# Patient Record
Sex: FEMALE | Race: WHITE | Employment: FULL TIME | URBAN - METROPOLITAN AREA
[De-identification: names, ages, dates, MRNs, and addresses within clinical notes are randomized per-mention and may not be internally consistent; named-entity substitution may affect disease eponyms.]

---

## 2017-07-05 PROBLEM — Z86.59 HISTORY OF MAJOR DEPRESSION: Status: ACTIVE | Noted: 2017-07-05

## 2017-07-05 PROBLEM — K58.0 IRRITABLE BOWEL SYNDROME WITH DIARRHEA: Status: ACTIVE | Noted: 2017-07-05

## 2017-07-05 PROBLEM — G47.9 SLEEP DISTURBANCE: Status: ACTIVE | Noted: 2017-07-05

## 2017-07-05 PROBLEM — K64.4 ANAL SKIN TAG: Status: ACTIVE | Noted: 2017-07-05

## 2017-11-22 ENCOUNTER — OFFICE VISIT (OUTPATIENT)
Dept: OBGYN CLINIC | Age: 27
End: 2017-11-22
Payer: COMMERCIAL

## 2017-11-22 VITALS
DIASTOLIC BLOOD PRESSURE: 64 MMHG | SYSTOLIC BLOOD PRESSURE: 112 MMHG | WEIGHT: 155.6 LBS | HEIGHT: 62 IN | BODY MASS INDEX: 28.63 KG/M2

## 2017-11-22 DIAGNOSIS — Z01.812 PRE-PROCEDURE LAB EXAM: Primary | ICD-10-CM

## 2017-11-22 PROCEDURE — 99203 OFFICE O/P NEW LOW 30 MIN: CPT | Performed by: OBSTETRICS & GYNECOLOGY

## 2017-11-22 RX ORDER — MISOPROSTOL 200 UG/1
TABLET ORAL
Qty: 2 TABLET | Refills: 0 | Status: SHIPPED | OUTPATIENT
Start: 2017-11-22 | End: 2017-11-29

## 2017-11-22 RX ORDER — IBUPROFEN 800 MG/1
800 TABLET ORAL EVERY 8 HOURS PRN
Qty: 20 TABLET | Refills: 0 | Status: SHIPPED | OUTPATIENT
Start: 2017-11-22 | End: 2018-05-17 | Stop reason: ALTCHOICE

## 2017-11-22 NOTE — PROGRESS NOTES
07/11/17   Comprehensive Metabolic Panel   Result Value Ref Range    Sodium  mmol/L    Chloride  mmol/L    Potassium  mmol/L    BUN  mg/dL    CREATININE      Glucose  mg/dL    AST  U/L    ALT  U/L    Calcium  mg/dL    Total Protein      CO2  mmol/L    Alb      Alkaline Phosphatase  U/L    Total Bilirubin  0.1 - 1.4 mg/dL    Gfr Calculated     Lipid Panel   Result Value Ref Range    Cholesterol, Total 224 mg/dL    HDL 52 35 - 70 mg/dL    LDL Calculated 118 0 - 160 mg/dL    Triglycerides 271 mg/dL    Chol/HDL Ratio 4.3     VLDL  mg/dL   TSH without Reflex   Result Value Ref Range    TSH  uIU/mL         Assessment:  1. Pre-procedure lab exam  misoprostol (CYTOTEC) 200 MCG tablet    ibuprofen (ADVIL;MOTRIN) 800 MG tablet    HCG, Quantitative, Pregnancy   Counseling for IUD placement  Patient Active Problem List    Diagnosis Date Noted    History of Clostridium difficile 07/05/2017    History of major depression 07/05/2017    Sleep disturbance 07/05/2017    Irritable bowel syndrome with diarrhea 07/05/2017    Anal skin tag 07/05/2017     PLAN:  Return in about 2 weeks (around 12/6/2017) for IUD placement. HCG lab order  Cytotec prior to IUD placement  Repeat Annual every 1 year  Cervical Cytology Evaluation begins at 24years old. If Negative Cytology, Follow-up screening per current guidelines. Return to the office in 2 weeks. Counseled on preventative health maintenance follow-up. Orders Placed This Encounter   Procedures    HCG, Quantitative, Pregnancy     Standing Status:   Future     Standing Expiration Date:   11/22/2018       Patient was seen with total face to face time of 30 minutes. More than 50% of this visit was counseling and education regarding The encounter diagnosis was Pre-procedure lab exam. and Gynecologic Exam   as well as  counseling on preventative health maintenance follow-up.

## 2017-11-29 ENCOUNTER — PROCEDURE VISIT (OUTPATIENT)
Dept: OBGYN CLINIC | Age: 27
End: 2017-11-29
Payer: COMMERCIAL

## 2017-11-29 ENCOUNTER — HOSPITAL ENCOUNTER (OUTPATIENT)
Age: 27
Setting detail: SPECIMEN
Discharge: HOME | End: 2017-11-29
Payer: COMMERCIAL

## 2017-11-29 VITALS
SYSTOLIC BLOOD PRESSURE: 90 MMHG | DIASTOLIC BLOOD PRESSURE: 60 MMHG | BODY MASS INDEX: 28.71 KG/M2 | WEIGHT: 156 LBS | HEIGHT: 62 IN

## 2017-11-29 DIAGNOSIS — Z01.812 PRE-PROCEDURE LAB EXAM: ICD-10-CM

## 2017-11-29 DIAGNOSIS — Z97.5 IUD (INTRAUTERINE DEVICE) IN PLACE: ICD-10-CM

## 2017-11-29 DIAGNOSIS — Z30.431 IUD CHECK UP: ICD-10-CM

## 2017-11-29 DIAGNOSIS — Z30.430 ENCOUNTER FOR IUD INSERTION: Primary | ICD-10-CM

## 2017-11-29 LAB
DIRECT EXAM: NORMAL
Lab: NORMAL
SPECIMEN DESCRIPTION: NORMAL
STATUS: NORMAL

## 2017-11-29 PROCEDURE — 58300 INSERT INTRAUTERINE DEVICE: CPT | Performed by: OBSTETRICS & GYNECOLOGY

## 2017-11-29 NOTE — PROGRESS NOTES
comfortably on the exam table. After a bi-manual exam; the uterus was found to be  anteverted with a size of 7 cm. There was no adnexal masses and the bladder was smooth, non-tender and without palpable masses. A sterile speculum was placed into the vagina and the cervix was identified. It was stabilized with an allis clamp. It was cleansed with betadine and the uterine sound was then gently passed into the endometrial cavity. The internal os was dilated with dilators. Pt. Tolerated well. Was unable to easily pass Mirena through internal os. Pt. Requested Lucian Na. The Lucian Na introducer was easily passed into endometrial cavity and deployed per  specifications. The patient tolerated the procedure well. Post procedure restrictions were reviewed and given to the patient. All counts and instruments were correct at the end of the procedure. Assessment:  1. Encounter for IUD insertion  UT INSERT INTRAUTERINE DEVICE    Levonorgestrel (KYLEENA) IUD 19.5 mg 1 each   2. IUD check up  US Pelvis Complete    US Non OB Transvaginal   3. IUD (intrauterine device) in place  US Pelvis Complete    US Non OB Transvaginal   4. Pre-procedure lab exam  VAGINITIS DNA PROBE    C. Trachomatis / SHAYLA Amaro     Patient Active Problem List    Diagnosis Date Noted    History of Clostridium difficile 07/05/2017    History of major depression 07/05/2017    Sleep disturbance 07/05/2017    Irritable bowel syndrome with diarrhea 07/05/2017    Anal skin tag 07/05/2017           PLAN:  TVUS ordered  String checks discussed  Cx taken today. Restrictions and instructions discussed. Questions answered.

## 2017-11-30 LAB
C TRACH DNA GENITAL QL NAA+PROBE: NEGATIVE
N. GONORRHOEAE DNA: NEGATIVE

## 2017-12-12 ENCOUNTER — HOSPITAL ENCOUNTER (OUTPATIENT)
Dept: ULTRASOUND IMAGING | Age: 27
Discharge: HOME | End: 2017-12-12
Payer: COMMERCIAL

## 2017-12-12 DIAGNOSIS — Z30.431 IUD CHECK UP: ICD-10-CM

## 2017-12-12 DIAGNOSIS — Z97.5 IUD (INTRAUTERINE DEVICE) IN PLACE: ICD-10-CM

## 2017-12-12 PROCEDURE — 76830 TRANSVAGINAL US NON-OB: CPT

## 2017-12-12 PROCEDURE — 76856 US EXAM PELVIC COMPLETE: CPT

## 2018-04-04 ENCOUNTER — OFFICE VISIT (OUTPATIENT)
Dept: OBGYN CLINIC | Age: 28
End: 2018-04-04
Payer: COMMERCIAL

## 2018-04-04 ENCOUNTER — HOSPITAL ENCOUNTER (OUTPATIENT)
Age: 28
Setting detail: SPECIMEN
Discharge: HOME | End: 2018-04-04
Payer: COMMERCIAL

## 2018-04-04 VITALS
DIASTOLIC BLOOD PRESSURE: 64 MMHG | SYSTOLIC BLOOD PRESSURE: 112 MMHG | BODY MASS INDEX: 26.68 KG/M2 | HEIGHT: 62 IN | RESPIRATION RATE: 16 BRPM | WEIGHT: 145 LBS

## 2018-04-04 DIAGNOSIS — N89.8 VAGINAL ITCHING: ICD-10-CM

## 2018-04-04 DIAGNOSIS — N89.8 VAGINAL ODOR: ICD-10-CM

## 2018-04-04 DIAGNOSIS — Z01.419 VISIT FOR GYNECOLOGIC EXAMINATION: Primary | ICD-10-CM

## 2018-04-04 DIAGNOSIS — R10.31 RIGHT LOWER QUADRANT ABDOMINAL PAIN: ICD-10-CM

## 2018-04-04 LAB
DIRECT EXAM: NORMAL
Lab: NORMAL
SPECIMEN DESCRIPTION: NORMAL
STATUS: NORMAL

## 2018-04-04 PROCEDURE — 99395 PREV VISIT EST AGE 18-39: CPT | Performed by: OBSTETRICS & GYNECOLOGY

## 2018-04-04 RX ORDER — FLUCONAZOLE 150 MG/1
150 TABLET ORAL ONCE
Qty: 1 TABLET | Refills: 0 | Status: SHIPPED | OUTPATIENT
Start: 2018-04-04 | End: 2018-04-04

## 2018-04-05 LAB
CHLAMYDIA BY THIN PREP: NEGATIVE
N. GONORRHOEAE DNA, THIN PREP: NEGATIVE

## 2018-04-18 LAB — CYTOLOGY REPORT: NORMAL

## 2018-04-19 ENCOUNTER — PATIENT MESSAGE (OUTPATIENT)
Dept: OBGYN CLINIC | Age: 28
End: 2018-04-19

## 2018-04-19 DIAGNOSIS — N89.8 VAGINAL ODOR: ICD-10-CM

## 2018-04-19 DIAGNOSIS — N89.8 VAGINAL ITCHING: Primary | ICD-10-CM

## 2018-04-19 RX ORDER — CLINDAMYCIN HYDROCHLORIDE 300 MG/1
300 CAPSULE ORAL 3 TIMES DAILY
Qty: 15 CAPSULE | Refills: 0 | Status: SHIPPED | OUTPATIENT
Start: 2018-04-19 | End: 2018-04-24

## 2018-04-19 RX ORDER — FLUCONAZOLE 150 MG/1
150 TABLET ORAL ONCE
Qty: 1 TABLET | Refills: 0 | Status: SHIPPED | OUTPATIENT
Start: 2018-04-19 | End: 2018-04-19

## 2018-05-03 ENCOUNTER — PATIENT MESSAGE (OUTPATIENT)
Dept: OBGYN CLINIC | Age: 28
End: 2018-05-03

## 2018-05-03 DIAGNOSIS — L29.2 VULVAR PRURITUS: Primary | ICD-10-CM

## 2018-05-04 RX ORDER — CLOBETASOL PROPIONATE 0.5 MG/G
CREAM TOPICAL
Qty: 1 TUBE | Refills: 0 | Status: SHIPPED | OUTPATIENT
Start: 2018-05-04 | End: 2018-10-26 | Stop reason: SDUPTHER

## 2018-05-07 ENCOUNTER — TELEPHONE (OUTPATIENT)
Dept: OBGYN CLINIC | Age: 28
End: 2018-05-07

## 2018-07-18 ENCOUNTER — OFFICE VISIT (OUTPATIENT)
Dept: OBGYN CLINIC | Age: 28
End: 2018-07-18
Payer: COMMERCIAL

## 2018-07-18 VITALS
RESPIRATION RATE: 14 BRPM | SYSTOLIC BLOOD PRESSURE: 100 MMHG | BODY MASS INDEX: 27.97 KG/M2 | WEIGHT: 152 LBS | HEIGHT: 62 IN | DIASTOLIC BLOOD PRESSURE: 60 MMHG

## 2018-07-18 DIAGNOSIS — N89.8 VAGINAL ITCHING: ICD-10-CM

## 2018-07-18 DIAGNOSIS — L29.2 VULVAR PRURITUS: Primary | ICD-10-CM

## 2018-07-18 PROCEDURE — 99213 OFFICE O/P EST LOW 20 MIN: CPT | Performed by: OBSTETRICS & GYNECOLOGY

## 2018-07-18 NOTE — PROGRESS NOTES
Brandyn Landa  2018    YOB: 1990    The patient was seen today. She is here regarding bumps on labial area. Her bowels are regular and she is voiding without difficulty. HPI:  Brandyn Landa is a 24 y.o. female      1 month of off and on two bumps in periclitoral area. She states there is some discomfort but there is no drainage. The right side bump is resolving but the left side is sore. No F/CH/N/V. Denies trauma. Obstetric History       T0      L0     SAB0   TAB0   Ectopic0   Molar0   Multiple0   Live Births0           Past Medical History:   Diagnosis Date    IUD (intrauterine device) in place 2017    [facility]        Past Surgical History:   Procedure Laterality Date    INTRAUTERINE DEVICE INSERTION  2017    dr abdul [facility]       Family History   Problem Relation Age of Onset    High Blood Pressure Mother     Allergy (Severe) Mother         seasonal    Allergy (Severe) Father         seasonal    Heart Disease Maternal Grandfather        Social History     Social History    Marital status: Single     Spouse name: N/A    Number of children: N/A    Years of education: N/A     Occupational History    Not on file.      Social History Main Topics    Smoking status: Former Smoker     Types: Cigarettes    Smokeless tobacco: Never Used      Comment: socially    Alcohol use 1.8 oz/week     3 Cans of beer per week      Comment: monthly twice    Drug use: Unknown      Comment: in past    Sexual activity: Yes     Partners: Male     Birth control/ protection: IUD     Other Topics Concern    Not on file     Social History Narrative    No narrative on file     MEDICATIONS:  Current Outpatient Prescriptions   Medication Sig Dispense Refill    omeprazole (PRILOSEC) 20 MG delayed release capsule Take 1 capsule by mouth Daily 30 capsule 2    albuterol sulfate HFA (PROAIR HFA) 108 (90 Base) MCG/ACT inhaler Inhale 2 puffs into the lungs every 6 hours as needed for Wheezing 1 Inhaler 3    clobetasol (TEMOVATE) 0.05 % cream Apply twice daily externally to the affected area. 14 days total. 1 Tube 0     Current Facility-Administered Medications   Medication Dose Route Frequency Provider Last Rate Last Dose    Levonorgestrel [facility]) IUD 19.5 mg 1 each  1 each Intrauterine Once Earlis Alu, DO   1 each at 11/29/17 1309     ALLERGIES:  Allergies as of 07/18/2018    (No Known Allergies)     REVIEW OF SYSTEMS:       A minimum of an eleven point review of systems was completed. Review Of Systems (11 point):  Constitutional: No fever, chills or malaise; No weight change or fatigue  Head and Eyes: No vision, Headache, Dizziness or trauma in last 12 months  ENT ROS: No hearing, Tinnitis, sinus or taste problems  Hematological and Lymphatic ROS:No Lymphoma, Von Willebrand's, Hemophillia or Bleeding History  Psych ROS: No Depression, Homicidal thoughts,suicidal thoughts, or anxiety  Breast ROS: No prior breast abnormalities or lumps  Respiratory ROS: No SOB, Pneumoniae,Cough, or Pulmonary Embolism History  Cardiovascular ROS: No Chest Pain with Exertion, Palpitations, Syncope, Edema, Arrhythmia  Gastrointestinal ROS: No Indigestion, Heartburn, Nausea, vomiting, Diarrhea, Constipation,or Bowel Changes; No Bloody Stools or melena  Genito-Urinary ROS: No Dysuria, Hematuria or Nocturia. No Urinary Incontinence or Vaginal Discharge  Musculoskeletal ROS: No Arthralgia, Arthritis,Gout,Osteoporosis or Rheumatism  Neurological ROS: No CVA, Migraines, Epilepsy, Seizure Hx, or Limb Weakness  Dermatological ROS: No Rash, Itching, Hives, Mole Changes or Cancer    Blood pressure 100/60, resp. rate 14, height 5' 2\" (1.575 m), weight 152 lb (68.9 kg), last menstrual period 07/17/2018, not currently breastfeeding. Abdomen: Soft non-tender; good bowel sounds. No guarding, rebound or rigidity. No CVA tenderness bilaterally.     Extremities: No calf tenderness, DTR 2/4, and No edema bilaterally    Pelvic: Vulva and vagina examined and small area of redness left periclitoral area. No drainage, no ulceration, isolated, no surrounding erythema. Diagnostics:  No results found. Lab Results:  Results for orders placed or performed during the hospital encounter of 04/04/18   C.trachomatis N.gonorrhoeae DNA, Thin Prep   Result Value Ref Range    Chlamydia By Thin Prep NEGATIVE NEG    N. gonorrhoeae DNA, Thin Prep NEGATIVE NEG   VAGINITIS DNA PROBE   Result Value Ref Range    Specimen Description . VAGINAL SWAB     Special Requests NOT REPORTED     Direct Exam NEGATIVE for Candida sp. Direct Exam NEGATIVE for Gardnerella vaginalis     Direct Exam NEGATIVE for Trichomonas vaginalis     Direct Exam       Method of testing is a DNA probe intended for detection and identification of    Direct Exam        Candida species, Gardnerella vaginalis, and Trichomonas vaginalis nucleic acid    Direct Exam        in vaginal fluid specimens from patients with symptoms of vaginitis/vaginosis. Direct Exam       [facility] [address] [facility], [address] [place] [address] [phone]    Status FINAL 04/04/2018    GYN Cytology   Result Value Ref Range    Cytology Report       (NOTE)  [id number]  [facility]  ANATOMIC PATHOLOGY  [address] [place] [address]. [place], [address]  [phone]  Fax: [phone]  GYNECOLOGIC CYTOLOGY REPORT    Patient Name: Eduar Eller  MR#: [id number]  Specimen #[id number]  Source:  1: Cervical material, (ThinPrep vial, Imaging-assisted review)    Clinical History  No LMP date given: having periods  Contraceptive use  Z01.419 Routine gyn exam without abnormal findings  High Risk HPV DNA testing is requested if the diagnosis is ASC-US    INTERPRETATION    Cervical material, (ThinPrep vial, Imaging-assisted review):  Specimen Adequacy:      Satisfactory for evaluation.      - Endocervical/transformation zone component present.   Descriptive

## 2018-10-26 DIAGNOSIS — L29.2 VULVAR PRURITUS: ICD-10-CM

## 2018-10-26 RX ORDER — CLOBETASOL PROPIONATE 0.5 MG/G
CREAM TOPICAL
Qty: 15 G | Refills: 0 | Status: SHIPPED | OUTPATIENT
Start: 2018-10-26

## 2018-11-26 DIAGNOSIS — R10.2 PELVIC PAIN IN FEMALE: Primary | ICD-10-CM

## 2018-11-28 ENCOUNTER — HOSPITAL ENCOUNTER (OUTPATIENT)
Dept: ULTRASOUND IMAGING | Age: 28
Discharge: HOME | End: 2018-11-30
Payer: COMMERCIAL

## 2018-11-28 DIAGNOSIS — R10.2 PELVIC PAIN IN FEMALE: ICD-10-CM

## 2018-11-28 PROCEDURE — 76856 US EXAM PELVIC COMPLETE: CPT

## 2018-11-28 PROCEDURE — 76830 TRANSVAGINAL US NON-OB: CPT

## 2018-12-04 ENCOUNTER — HOSPITAL ENCOUNTER (OUTPATIENT)
Age: 28
Discharge: HOME | End: 2018-12-04
Payer: COMMERCIAL

## 2018-12-04 LAB
ABSOLUTE EOS #: 0.07 K/UL (ref 0–0.44)
ABSOLUTE IMMATURE GRANULOCYTE: <0.03 K/UL (ref 0–0.3)
ABSOLUTE LYMPH #: 2.82 K/UL (ref 1.1–3.7)
ABSOLUTE MONO #: 0.67 K/UL (ref 0.1–1.2)
ALBUMIN SERPL-MCNC: 4.3 G/DL (ref 3.5–5.2)
ALBUMIN/GLOBULIN RATIO: 1.4 (ref 1–2.5)
ALP BLD-CCNC: 90 U/L (ref 35–104)
ALT SERPL-CCNC: 19 U/L (ref 5–33)
ANION GAP SERPL CALCULATED.3IONS-SCNC: 12 MMOL/L (ref 9–17)
AST SERPL-CCNC: 20 U/L
BASOPHILS # BLD: 0 % (ref 0–2)
BASOPHILS ABSOLUTE: 0.04 K/UL (ref 0–0.2)
BILIRUB SERPL-MCNC: 0.32 MG/DL (ref 0.3–1.2)
BUN BLDV-MCNC: 15 MG/DL (ref 6–20)
BUN/CREAT BLD: NORMAL (ref 9–20)
CALCIUM SERPL-MCNC: 10.1 MG/DL (ref 8.6–10.4)
CHLORIDE BLD-SCNC: 101 MMOL/L (ref 98–107)
CO2: 25 MMOL/L (ref 20–31)
CREAT SERPL-MCNC: 0.9 MG/DL (ref 0.5–0.9)
DIFFERENTIAL TYPE: NORMAL
EOSINOPHILS RELATIVE PERCENT: 1 % (ref 1–4)
GFR AFRICAN AMERICAN: >60 ML/MIN
GFR NON-AFRICAN AMERICAN: >60 ML/MIN
GFR SERPL CREATININE-BSD FRML MDRD: NORMAL ML/MIN/{1.73_M2}
GFR SERPL CREATININE-BSD FRML MDRD: NORMAL ML/MIN/{1.73_M2}
GLUCOSE BLD-MCNC: 86 MG/DL (ref 70–99)
HCT VFR BLD CALC: 39.4 % (ref 36.3–47.1)
HEMOGLOBIN: 13.2 G/DL (ref 11.9–15.1)
IMMATURE GRANULOCYTES: 0 %
LYMPHOCYTES # BLD: 29 % (ref 24–43)
MCH RBC QN AUTO: 28.8 PG (ref 25.2–33.5)
MCHC RBC AUTO-ENTMCNC: 33.5 G/DL (ref 28.4–34.8)
MCV RBC AUTO: 85.8 FL (ref 82.6–102.9)
MONOCYTES # BLD: 7 % (ref 3–12)
NRBC AUTOMATED: 0 PER 100 WBC
PDW BLD-RTO: 12.2 % (ref 11.8–14.4)
PLATELET # BLD: 305 K/UL (ref 138–453)
PLATELET ESTIMATE: NORMAL
PMV BLD AUTO: 11.7 FL (ref 8.1–13.5)
POTASSIUM SERPL-SCNC: 4 MMOL/L (ref 3.7–5.3)
RBC # BLD: 4.59 M/UL (ref 3.95–5.11)
RBC # BLD: NORMAL 10*6/UL
SEG NEUTROPHILS: 63 % (ref 36–65)
SEGMENTED NEUTROPHILS ABSOLUTE COUNT: 6.22 K/UL (ref 1.5–8.1)
SODIUM BLD-SCNC: 138 MMOL/L (ref 135–144)
T3 FREE: 2.6 PG/ML (ref 2.02–4.43)
TOTAL PROTEIN: 7.4 G/DL (ref 6.4–8.3)
TSH SERPL DL<=0.05 MIU/L-ACNC: 1.73 MIU/L (ref 0.3–5)
WBC # BLD: 9.8 K/UL (ref 3.5–11.3)
WBC # BLD: NORMAL 10*3/UL

## 2018-12-04 PROCEDURE — 84436 ASSAY OF TOTAL THYROXINE: CPT

## 2018-12-04 PROCEDURE — 84443 ASSAY THYROID STIM HORMONE: CPT

## 2018-12-04 PROCEDURE — 84481 FREE ASSAY (FT-3): CPT

## 2018-12-04 PROCEDURE — 80053 COMPREHEN METABOLIC PANEL: CPT

## 2018-12-04 PROCEDURE — 85025 COMPLETE CBC W/AUTO DIFF WBC: CPT

## 2018-12-04 PROCEDURE — 36415 COLL VENOUS BLD VENIPUNCTURE: CPT

## 2018-12-05 ENCOUNTER — OFFICE VISIT (OUTPATIENT)
Dept: PRIMARY CARE CLINIC | Age: 28
End: 2018-12-05
Payer: COMMERCIAL

## 2018-12-05 VITALS
DIASTOLIC BLOOD PRESSURE: 82 MMHG | SYSTOLIC BLOOD PRESSURE: 120 MMHG | BODY MASS INDEX: 27.95 KG/M2 | WEIGHT: 152.8 LBS | HEART RATE: 108 BPM | TEMPERATURE: 98.7 F | OXYGEN SATURATION: 99 %

## 2018-12-05 DIAGNOSIS — Z23 NEED FOR VACCINATION: ICD-10-CM

## 2018-12-05 DIAGNOSIS — R10.31 RLQ ABDOMINAL PAIN: Primary | ICD-10-CM

## 2018-12-05 DIAGNOSIS — Z00.00 HEALTHCARE MAINTENANCE: ICD-10-CM

## 2018-12-05 LAB
BILIRUBIN, POC: NORMAL
BLOOD URINE, POC: NORMAL
CLARITY, POC: CLEAR
COLOR, POC: YELLOW
GLUCOSE URINE, POC: NORMAL
KETONES, POC: NORMAL
LEUKOCYTE EST, POC: NORMAL
NITRITE, POC: NORMAL
PH, POC: 6.5
PROTEIN, POC: NORMAL
SPECIFIC GRAVITY, POC: <=1.005
UROBILINOGEN, POC: NORMAL

## 2018-12-05 PROCEDURE — 90715 TDAP VACCINE 7 YRS/> IM: CPT | Performed by: PHYSICIAN ASSISTANT

## 2018-12-05 PROCEDURE — 99214 OFFICE O/P EST MOD 30 MIN: CPT | Performed by: PHYSICIAN ASSISTANT

## 2018-12-05 PROCEDURE — 81003 URINALYSIS AUTO W/O SCOPE: CPT | Performed by: PHYSICIAN ASSISTANT

## 2018-12-05 PROCEDURE — 90471 IMMUNIZATION ADMIN: CPT | Performed by: PHYSICIAN ASSISTANT

## 2018-12-05 PROCEDURE — G8419 CALC BMI OUT NRM PARAM NOF/U: HCPCS | Performed by: PHYSICIAN ASSISTANT

## 2018-12-05 PROCEDURE — 1036F TOBACCO NON-USER: CPT | Performed by: PHYSICIAN ASSISTANT

## 2018-12-05 PROCEDURE — G8484 FLU IMMUNIZE NO ADMIN: HCPCS | Performed by: PHYSICIAN ASSISTANT

## 2018-12-05 PROCEDURE — G8427 DOCREV CUR MEDS BY ELIG CLIN: HCPCS | Performed by: PHYSICIAN ASSISTANT

## 2018-12-05 RX ORDER — BUPROPION HYDROCHLORIDE 300 MG/1
1 TABLET ORAL EVERY MORNING
Refills: 2 | COMMUNITY
Start: 2018-11-18 | End: 2020-10-16

## 2018-12-05 RX ORDER — KETOCONAZOLE 20 MG/ML
SHAMPOO TOPICAL DAILY
Refills: 2 | COMMUNITY
Start: 2018-09-20

## 2018-12-05 RX ORDER — BUPROPION HYDROCHLORIDE 150 MG/1
1 TABLET ORAL EVERY MORNING
Refills: 0 | COMMUNITY
Start: 2018-10-02 | End: 2020-10-16

## 2018-12-05 ASSESSMENT — ENCOUNTER SYMPTOMS
FLATUS: 1
DIARRHEA: 0
NAUSEA: 0
HEMATOCHEZIA: 0
RESPIRATORY NEGATIVE: 1
ABDOMINAL PAIN: 1
BACK PAIN: 0
VOMITING: 0
CONSTIPATION: 1

## 2018-12-05 ASSESSMENT — PATIENT HEALTH QUESTIONNAIRE - PHQ9
2. FEELING DOWN, DEPRESSED OR HOPELESS: 0
1. LITTLE INTEREST OR PLEASURE IN DOING THINGS: 0
SUM OF ALL RESPONSES TO PHQ9 QUESTIONS 1 & 2: 0
SUM OF ALL RESPONSES TO PHQ QUESTIONS 1-9: 0
SUM OF ALL RESPONSES TO PHQ QUESTIONS 1-9: 0

## 2018-12-05 ASSESSMENT — CROHNS DISEASE ACTIVITY INDEX (CDAI): CDAI SCORE: 0

## 2018-12-05 NOTE — PROGRESS NOTES
ABDOMEN PELVIS W IV CONTRAST   2. Need for vaccination  Tdap (age 10y-63y) IM (ADACEL)   3. Healthcare maintenance  HIV Screen        Plan:    REviewed recent BW and pelvic US  Return for After CT scan. Orders Placed This Encounter   Procedures    CT ABDOMEN PELVIS W IV CONTRAST     Standing Status:   Future     Standing Expiration Date:   12/5/2019    Tdap (age 10y-63y) IM (ADACEL)    HIV Screen     Standing Status:   Future     Standing Expiration Date:   12/5/2019    POCT Urinalysis No Micro (Auto)     No orders of the defined types were placed in this encounter.           Electronically signed by Ara Palm 12/5/2018 at 4:43 PM

## 2018-12-07 LAB — T4 TOTAL: 7.6 UG/DL (ref 4.5–12)

## 2018-12-19 DIAGNOSIS — R10.31 RLQ ABDOMINAL PAIN: ICD-10-CM

## 2019-01-21 DIAGNOSIS — R19.5 INCREASED MUCUS IN STOOL: Primary | ICD-10-CM

## 2019-01-21 DIAGNOSIS — R19.7 DIARRHEA, UNSPECIFIED TYPE: ICD-10-CM

## 2019-01-25 LAB — C DIFFICILE TOXIN, EIA: NORMAL

## 2019-01-26 LAB — CULTURE, STOOL: NORMAL

## 2019-01-31 ENCOUNTER — OFFICE VISIT (OUTPATIENT)
Dept: PRIMARY CARE CLINIC | Age: 29
End: 2019-01-31
Payer: COMMERCIAL

## 2019-01-31 VITALS
OXYGEN SATURATION: 98 % | SYSTOLIC BLOOD PRESSURE: 114 MMHG | DIASTOLIC BLOOD PRESSURE: 74 MMHG | HEART RATE: 79 BPM | BODY MASS INDEX: 28.2 KG/M2 | WEIGHT: 154.2 LBS

## 2019-01-31 DIAGNOSIS — G47.9 SLEEP DISTURBANCE: ICD-10-CM

## 2019-01-31 DIAGNOSIS — G47.19 EXCESSIVE DAYTIME SLEEPINESS: ICD-10-CM

## 2019-01-31 DIAGNOSIS — R06.83 SNORING: ICD-10-CM

## 2019-01-31 DIAGNOSIS — K58.0 IRRITABLE BOWEL SYNDROME WITH DIARRHEA: Primary | ICD-10-CM

## 2019-01-31 PROCEDURE — 1036F TOBACCO NON-USER: CPT | Performed by: PHYSICIAN ASSISTANT

## 2019-01-31 PROCEDURE — G8427 DOCREV CUR MEDS BY ELIG CLIN: HCPCS | Performed by: PHYSICIAN ASSISTANT

## 2019-01-31 PROCEDURE — G8419 CALC BMI OUT NRM PARAM NOF/U: HCPCS | Performed by: PHYSICIAN ASSISTANT

## 2019-01-31 PROCEDURE — 99213 OFFICE O/P EST LOW 20 MIN: CPT | Performed by: PHYSICIAN ASSISTANT

## 2019-01-31 PROCEDURE — G8484 FLU IMMUNIZE NO ADMIN: HCPCS | Performed by: PHYSICIAN ASSISTANT

## 2019-01-31 ASSESSMENT — ENCOUNTER SYMPTOMS
BLOOD IN STOOL: 0
ABDOMINAL DISTENTION: 0
ABDOMINAL PAIN: 0
NAUSEA: 0
CONSTIPATION: 0
ANAL BLEEDING: 0
VOMITING: 0
DIARRHEA: 0
RESPIRATORY NEGATIVE: 1
BACK PAIN: 0

## 2019-02-04 ENCOUNTER — HOSPITAL ENCOUNTER (OUTPATIENT)
Age: 29
Discharge: HOME | End: 2019-02-04
Payer: COMMERCIAL

## 2019-02-04 LAB
IGA: 178 MG/DL (ref 70–400)
Lab: NORMAL
MICRO OVA & PARASITES: NORMAL
SPECIMEN DESCRIPTION: NORMAL
STATUS: NORMAL

## 2019-02-04 PROCEDURE — 87328 CRYPTOSPORIDIUM AG IA: CPT

## 2019-02-04 PROCEDURE — 82784 ASSAY IGA/IGD/IGG/IGM EACH: CPT

## 2019-02-04 PROCEDURE — 83516 IMMUNOASSAY NONANTIBODY: CPT

## 2019-02-04 PROCEDURE — 87329 GIARDIA AG IA: CPT

## 2019-02-04 PROCEDURE — 83520 IMMUNOASSAY QUANT NOS NONAB: CPT

## 2019-02-04 PROCEDURE — 36415 COLL VENOUS BLD VENIPUNCTURE: CPT

## 2019-02-04 PROCEDURE — 87505 NFCT AGENT DETECTION GI: CPT

## 2019-02-04 PROCEDURE — 83993 ASSAY FOR CALPROTECTIN FECAL: CPT

## 2019-02-05 LAB
CAMPYLOBACTER PCR: NORMAL
DIRECT EXAM: NORMAL
DIRECT EXAM: NORMAL
Lab: NORMAL
SALMONELLA PCR: NORMAL
SHIGATOXIN GENE PCR: NORMAL
SHIGELLA SP PCR: NORMAL
SPECIMEN DESCRIPTION: NORMAL
SPECIMEN: NORMAL
STATUS: NORMAL
TISSUE TRANSGLUTAMINASE ANTIBODY IGG: <0.6 U/ML
TISSUE TRANSGLUTAMINASE IGA: 0.3 U/ML

## 2019-02-08 LAB — CALPROTECTIN, FECAL: <16 UG/G

## 2019-02-09 LAB — FECAL PANCREATIC ELASTASE-1: >500 UG/G

## 2019-02-20 DIAGNOSIS — G47.19 EXCESSIVE DAYTIME SLEEPINESS: ICD-10-CM

## 2019-02-20 DIAGNOSIS — R06.83 SNORING: ICD-10-CM

## 2019-02-20 DIAGNOSIS — R06.81 WITNESSED EPISODE OF APNEA: ICD-10-CM

## 2019-03-18 ENCOUNTER — HOSPITAL ENCOUNTER (OUTPATIENT)
Age: 29
Setting detail: SPECIMEN
Discharge: HOME | End: 2019-03-18
Payer: COMMERCIAL

## 2019-03-20 LAB — SURGICAL PATHOLOGY REPORT: NORMAL

## 2019-03-28 DIAGNOSIS — G47.19 EXCESSIVE DAYTIME SLEEPINESS: ICD-10-CM

## 2019-03-28 DIAGNOSIS — R06.81 WITNESSED EPISODE OF APNEA: ICD-10-CM

## 2019-03-28 DIAGNOSIS — R06.83 SNORING: ICD-10-CM

## 2020-10-16 ENCOUNTER — HOSPITAL ENCOUNTER (OUTPATIENT)
Age: 30
Setting detail: SPECIMEN
Discharge: HOME | End: 2020-10-16
Payer: COMMERCIAL

## 2020-10-16 ENCOUNTER — OFFICE VISIT (OUTPATIENT)
Dept: OBGYN CLINIC | Age: 30
End: 2020-10-16
Payer: COMMERCIAL

## 2020-10-16 VITALS
SYSTOLIC BLOOD PRESSURE: 120 MMHG | DIASTOLIC BLOOD PRESSURE: 70 MMHG | WEIGHT: 130 LBS | BODY MASS INDEX: 23.04 KG/M2 | TEMPERATURE: 97.1 F | HEIGHT: 63 IN

## 2020-10-16 PROBLEM — F32.81 PMDD (PREMENSTRUAL DYSPHORIC DISORDER): Status: ACTIVE | Noted: 2020-10-16

## 2020-10-16 PROBLEM — L29.2 VULVAR PRURITUS: Status: ACTIVE | Noted: 2020-10-16

## 2020-10-16 PROCEDURE — 99395 PREV VISIT EST AGE 18-39: CPT | Performed by: OBSTETRICS & GYNECOLOGY

## 2020-10-16 RX ORDER — MELOXICAM 15 MG/1
15 TABLET ORAL DAILY
Qty: 30 TABLET | Refills: 3 | Status: SHIPPED | OUTPATIENT
Start: 2020-10-16

## 2020-10-16 RX ORDER — NYSTATIN 100000 [USP'U]/G
POWDER TOPICAL
Qty: 1 BOTTLE | Refills: 0 | Status: SHIPPED | OUTPATIENT
Start: 2020-10-16

## 2020-10-16 NOTE — PROGRESS NOTES
History and Physical  Mega Orozco  10/16/2020              31 y.o. Chief Complaint   Patient presents with    Gynecologic Exam       No LMP recorded. Patient has had an implant. Primary Care Physician: Jagdish Norton PA-C    The patient was seen and examined. She has no chief complaint today and is here for her annual exam.  Her bowels are regular. There are no voiding complaints. She denies any bloating. She denies vaginal discharge and was counseled on STD's and the need for barrier contraception. HPI : Mega Orozco is a 31 y.o. female     Pt. Doing well. States she has been having external pruritis. Denies discharge. She does have some labial itching after work and wearing tight clothing. It improves with loose clothing. It is mild    She is happy with IUD, but concerned about more depression and feelings of hopelessness and anxiety. Discussed PMDD. She does have history of depression, but she states it seems to be worse before her period, but does happen spontaneously as well. She is in a supportive and positive relationship and is happy with her job. Discussed options and she prefers to avoid medication at this time. I agree therapy would be helpful and possibly removal of IUD and another form menstrual regulation if this continues.   ________________________________________________________________________  [facility] History    Para Term  AB Living   0 0 0 0 0 0   SAB TAB Ectopic Molar Multiple Live Births   0 0 0 0 0 0     Past Medical History:   Diagnosis Date    Anxiety     IUD (intrauterine device) in place 2017    [facility]                                                                    Past Surgical History:   Procedure Laterality Date    INTRAUTERINE DEVICE INSERTION  2017    dr abdul [facility]    WISDOM TOOTH EXTRACTION       Family History   Problem Relation Age of Onset    High Blood Pressure Mother     Allergy (Severe) Mother seasonal    Allergy (Severe) Father         seasonal    Heart Disease Maternal Grandfather      Social History     Socioeconomic History    Marital status: Single     Spouse name: Not on file    Number of children: Not on file    Years of education: Not on file    Highest education level: Not on file   Occupational History    Not on file   Social Needs    Financial resource strain: Not on file    Food insecurity     Worry: Not on file     Inability: Not on file    Transportation needs     Medical: Not on file     Non-medical: Not on file   Tobacco Use    Smoking status: Former Smoker     Types: Cigarettes    Smokeless tobacco: Never Used    Tobacco comment: socially   Substance and Sexual Activity    Alcohol use: Yes     Alcohol/week: 3.0 standard drinks     Types: 3 Cans of beer per week     Comment: monthly twice    Drug use: Not on file     Comment: in past    Sexual activity: Yes     Partners: Male     Birth control/protection: I.U.D. Lifestyle    Physical activity     Days per week: Not on file     Minutes per session: Not on file    Stress: Not on file   Relationships    Social connections     Talks on phone: Not on file     Gets together: Not on file     Attends Mandaeism service: Not on file     Active member of club or organization: Not on file     Attends meetings of clubs or organizations: Not on file     Relationship status: Not on file    Intimate partner violence     Fear of current or ex partner: Not on file     Emotionally abused: Not on file     Physically abused: Not on file     Forced sexual activity: Not on file   Other Topics Concern    Not on file   Social History Narrative    Not on file       MEDICATIONS:  Current Outpatient Medications   Medication Sig Dispense Refill    meloxicam (MOBIC) 15 MG tablet Take 1 tablet by mouth daily Take for 5 days when premenstrual symptoms begin.  30 tablet 3    nystatin (MYCOSTATIN) 204510 UNIT/GM powder Apply 2 times daily as needed for itching/irritation 1 Bottle 0    ketoconazole (NIZORAL) 2 % shampoo Apply topically daily to scalp  2    clobetasol (TEMOVATE) 0.05 % cream APPLY TWICE DAILY EXTERNALLY TO THE AFFECTED AREA. 14 DAYS TOTAL. 15 g 0    albuterol sulfate HFA (PROAIR HFA) 108 (90 Base) MCG/ACT inhaler Inhale 2 puffs into the lungs every 6 hours as needed for Wheezing 1 Inhaler 3     Current Facility-Administered Medications   Medication Dose Route Frequency Provider Last Rate Last Dose    Levonorgestrel [facility]) IUD 19.5 mg 1 each  1 each Intrauterine Once Shadi Ko, DO   1 each at 11/29/17 1309           ALLERGIES:  Allergies as of 10/16/2020    (No Known Allergies)       Symptoms of decreased mood absent    **If either question is answered in a  positive fashion then complete the PHQ9 Scoring Evaluation and make the appropriate referral**      Immunization status: stated as current, but no records available. Gynecologic History:     No LMP recorded. Patient has had an implant. Sexually Active: Yes    STD History: No     Permanent Sterilization: No   Reversible Birth Control: Yes IUD        Hormone Replacement Exposure: No      Genetic Qualified Family History of Breast, Ovarian , Colon or Uterine Cancer: No     If YES see scanned worksheet. Preventative Health Testing:    Health Maintenance:  Health Maintenance Due   Topic Date Due    Varicella vaccine (1 of 2 - 2-dose childhood series) 07/29/1993    HIV screen  07/29/2007    Flu vaccine (1) 09/01/2020       Date of Last Pap Smear: 4/2018  Abnormal Pap Smear History: denies  Colposcopy History:   Date of Last Mammogram: none  Date of Last Colonoscopy:   Date of Last Bone Density:      ________________________________________________________________________    REVIEW OF SYSTEMS:       A minimum of an eleven point review of systems was completed. Review Of Systems (11 point):  Constitutional: No fever, chills or malaise;  No weight change or fatigue  Head and found to be clear. There were no rales, rhonchi or wheezes. There was a good respiratory effort. Cardiovascular: The heart was in a regular rate and rhythm. . No S3 or S4. There was no murmur appreciated. Location, grade, and radiation are not applicable. Extremities: The patients extremities were without calf tenderness, edema, or varicosities. There was full range of motion in all four extremities. Pulses in all four extremities were appreciated and are 2/4. Abdomen: The abdomen was soft and non-tender. There were good bowel sounds in all quadrants and there was no guarding, rebound or rigidity. On evaluation there was no evidence of hepatosplenomegaly and there was no costal vertebral nae tenderness bilaterally. No hernias were appreciated. Abdominal Scars:     Psych: The patient had a normal Orientation to: Time, Place, Person, and Situation  There is no Mood / Affect changes    Breast:  (Chest)  normal appearance, no masses or tenderness  Self breast exams were reviewed in detail. Literature was given. Pelvic Exam:  Vulva and vagina appear normal.  Small amount of irriation noted labia majora. Bimanual exam reveals normal uterus and adnexa. Rectal Exam:  exam declined by patient          Musculosk:  Normal Gait and station was noted. Digits were evaluated without abnormal findings. Range of motion, stability and strength were evaluated and found to be appropriate for the patients age. OMM Structural Component:  The patient did not complain of a Chief complaint requiring OMM. Chief Complaint:none    Structural Exam: Refused        ASSESSMENT:      31 y.o. Annual   Diagnosis Orders   1. Encounter for annual routine gynecological examination  PAP SMEAR   2. Vaginal itching  Chlamydia/GC DNA, Thin Prep    VAGINITIS DNA PROBE    nystatin (MYCOSTATIN) 630006 UNIT/GM powder   3.  Vaginal discharge  Chlamydia/GC DNA, Thin Prep    VAGINITIS DNA PROBE    nystatin (MYCOSTATIN) 323146 UNIT/GM powder   4. Dysmenorrhea  meloxicam (MOBIC) 15 MG tablet   5. Vulvar pruritus     6. PMDD (premenstrual dysphoric disorder)            Chief Complaint   Patient presents with    Gynecologic Exam          Past Medical History:   Diagnosis Date    Anxiety     IUD (intrauterine device) in place 11/29/2017    [facility]          Patient Active Problem List    Diagnosis Date Noted    Vulvar pruritus 10/16/2020    PMDD (premenstrual dysphoric disorder) 10/16/2020    IUD (intrauterine device) in place 11/29/2017     [facility]       History of Clostridium difficile 07/05/2017    History of major depression 07/05/2017    Sleep disturbance 07/05/2017    Irritable bowel syndrome with diarrhea 07/05/2017    Anal skin tag 07/05/2017          Hereditary Breast, Ovarian, Colon and Uterine Cancer screening Done. Tobacco & Secondary smoke risks reviewed; instructed on cessation and avoidance      Counseling Completed:  Preventative Health Recommendations and Follow up. PLAN:  Return in about 3 months (around 1/16/2021) for Follow up PMDD and Dysmenorrhea. Repeat Annual every 1 year  Cervical Cytology Evaluation begins at 24years old. If Negative Cytology, Follow-up screening per current guidelines. MVI dosing reviewed. Birth control and barrier recommendations discussed. STD counseling and prevention reviewed. Routine health maintenance per patients PCP. Orders Placed This Encounter   Procedures    Chlamydia/GC DNA, Thin Prep     Standing Status:   Future     Standing Expiration Date:   10/16/2021    VAGINITIS DNA PROBE    PAP SMEAR     Patient History:    No LMP recorded. Patient has had an implant.   OBGYN Status: Implant  Past Surgical History:  11/29/2017: INTRAUTERINE DEVICE INSERTION      Comment:  dr abdul [facility]  Medications/Contraceptives Affecting Cytology     Progestin Contraceptives - IUD Disp Start End     Levonorgestrel [facility]) IUD 19.5 mg 1 each     11/29/2017      1 each, Intrauterine, ONCE       Social History    Tobacco Use      Smoking status: Former Smoker        Types: Cigarettes      Smokeless tobacco: Never Used      Tobacco comment: socially       Standing Status:   Future     Standing Expiration Date:   10/17/2021     Order Specific Question:   Collection Type     Answer: Thin Prep     Order Specific Question:   Prior Abnormal Pap Test     Answer:   No     Order Specific Question:   Screening or Diagnostic     Answer:   Screening     Order Specific Question:   HPV Requested?      Answer:   Yes -  If ASCUS Reflex HPV     Order Specific Question:   High Risk Patient     Answer:   N/A

## 2020-10-17 LAB
DIRECT EXAM: NORMAL
Lab: NORMAL
SPECIMEN DESCRIPTION: NORMAL

## 2020-10-23 LAB
CHLAMYDIA BY THIN PREP: NEGATIVE
N. GONORRHOEAE DNA, THIN PREP: NEGATIVE
SPECIMEN DESCRIPTION: NORMAL

## 2020-10-26 LAB — CYTOLOGY REPORT: NORMAL

## 2021-08-03 DIAGNOSIS — M25.562 ACUTE PAIN OF LEFT KNEE: Primary | ICD-10-CM

## 2021-08-04 ENCOUNTER — OFFICE VISIT (OUTPATIENT)
Dept: ORTHOPEDIC SURGERY | Age: 31
End: 2021-08-04
Payer: COMMERCIAL

## 2021-08-04 VITALS
DIASTOLIC BLOOD PRESSURE: 57 MMHG | WEIGHT: 130 LBS | SYSTOLIC BLOOD PRESSURE: 115 MMHG | HEART RATE: 61 BPM | HEIGHT: 63 IN | RESPIRATION RATE: 12 BRPM | BODY MASS INDEX: 23.04 KG/M2 | TEMPERATURE: 98.2 F

## 2021-08-04 DIAGNOSIS — M23.52 RECURRENT LEFT KNEE INSTABILITY: Primary | ICD-10-CM

## 2021-08-04 PROCEDURE — 99203 OFFICE O/P NEW LOW 30 MIN: CPT | Performed by: FAMILY MEDICINE

## 2021-08-04 NOTE — PROGRESS NOTES
Sports Medicine Consultation     CHIEF COMPLAINT:  Knee Pain (left knee)      HPI:  Jessenia Allen is a 36y.o. year old female who is a new patient being seen for regarding new problem left knee pain. The pain has been present for 5 month(s). The patient recalls a hyperextending type injury. The patient has tried PT, THC without improvement, but pain was controlled with THC. The pain is described as achy. There is  pain on weightbearing. The knee does swell. There is is not painful popping and clicking. The knee does not catch or lock. It has given out. It is  stiff upon arising from sitting. It is  painful to go up and down stairs and sit for a prolonged period of time. she has a past medical history of Anxiety and IUD (intrauterine device) in place. she has a past surgical history that includes intrauterine device insertion (11/29/2017) and [place] tooth extraction. family history includes Allergy (Severe) in her father and mother; Heart Disease in her maternal grandfather; High Blood Pressure in her mother. Social History     Socioeconomic History    Marital status: Single     Spouse name: Not on file    Number of children: Not on file    Years of education: Not on file    Highest education level: Not on file   Occupational History    Not on file   Tobacco Use    Smoking status: Former Smoker     Types: Cigarettes    Smokeless tobacco: Never Used    Tobacco comment: socially   Substance and Sexual Activity    Alcohol use: Yes     Alcohol/week: 3.0 standard drinks     Types: 3 Cans of beer per week     Comment: monthly twice    Drug use: Not on file     Comment: in past    Sexual activity: Yes     Partners: Male     Birth control/protection: I.U.D.    Other Topics Concern    Not on file   Social History Narrative    Not on file     Social Determinants of Health     Financial Resource Strain:     Difficulty of Paying Living Expenses:    Food Insecurity:     Worried About Running Out of Food in the Last Year:     Xochitl of Food in the Last Year:    Transportation Needs:     Lack of Transportation (Medical):  Lack of Transportation (Non-Medical):    Physical Activity:     Days of Exercise per Week:     Minutes of Exercise per Session:    Stress:     Feeling of Stress :    Social Connections:     Frequency of Communication with Friends and Family:     Frequency of Social Gatherings with Friends and Family:     Attends Latter-day Services:     Active Member of Clubs or Organizations:     Attends Club or Organization Meetings:     Marital Status:    Intimate Partner Violence:     Fear of Current or Ex-Partner:     Emotionally Abused:     Physically Abused:     Sexually Abused:        Current Outpatient Medications   Medication Sig Dispense Refill    meloxicam (MOBIC) 15 MG tablet Take 1 tablet by mouth daily Take for 5 days when premenstrual symptoms begin. 30 tablet 3    nystatin (MYCOSTATIN) 965573 UNIT/GM powder Apply 2 times daily as needed for itching/irritation 1 Bottle 0    ketoconazole (NIZORAL) 2 % shampoo Apply topically daily to scalp  2    clobetasol (TEMOVATE) 0.05 % cream APPLY TWICE DAILY EXTERNALLY TO THE AFFECTED AREA. 14 DAYS TOTAL. 15 g 0    albuterol sulfate HFA (PROAIR HFA) 108 (90 Base) MCG/ACT inhaler Inhale 2 puffs into the lungs every 6 hours as needed for Wheezing 1 Inhaler 3     Current Facility-Administered Medications   Medication Dose Route Frequency Provider Last Rate Last Admin    Levonorgestrel [facility]) IUD 19.5 mg 1 each  1 each Intrauterine Once Torey Verma, DO   1 each at 11/29/17 1309       Allergies:  shehas No Known Allergies. ROS:  CV:  Denies chest pain; palpitations; shortness of breath; swelling of feet, ankles; and loss of consciousness. CON: Denies fever and dizziness. ENT:  Denies hearing loss / ringing, ear infections hoarseness, and swallowing problems.   RESP:  Denies chronic cough, spitting up blood, and asthma/wheezing. GI: Denies abdominal pain, change in bowel habits, nausea or vomiting, and blood in stools. :  Denies frequent urination, burning or painful urination, blood in the urine, and bladder incontinence. NEURO:  Denies headache, memory loss, sleep disturbance, and tremor or movement disorder. PHYSICAL EXAM:   BP (!) 115/57   Pulse 61   Temp 98.2 °F (36.8 °C)   Resp 12   Ht 5' 3\" (1.6 m)   Wt 130 lb (59 kg)   BMI 23.03 kg/m²   GENERAL: Lazarus Opoka is a 36 y.o. female who is alert and oriented and sitting comfortably in our office. SKIN:  Intact without rashes, lesions or ulcerations. NEURO: Sensation to the extremity is intact. VASC:  Capillary refill is less than 3 seconds. Distal pulses are palpable. There is no lymphadenopathy. Knee Exam  Musculoskeletal/Neurologic:  Inspection-Swelling: none, Ecchymosis: no  Palpation-Tenderness:lateral and PF comparment  Pain with patellar grind: yes  ROM- 0-135  Strength- WNL  Sensation-normal to light touch    Special Tests-  Varus Laxity: negative   Valgus Laxity:  negative   Anterior Drawer: negative   Posterior Drawer: negative  Lachman's: negative  Marlene's:positive  Thessaly: positive    Single Leg Squat: Positive  Deep Squat: Positive  Gait: normal    PSYCH:  Good fund of knowledge and displays understanding of exam.    RADIOLOGY: No results found. 4 views of the left  knee were ordered, independently visualized by me, and discussed with patient. Findings: Left knee radiographs failed to demonstrate any significant osseous abnormalities no obvious fractures or dislocations are noted on plain film radiograph of the left knee    Impression: No acute osseous abnormalities of the left knee    IMPRESSION:     1. Recurrent left knee instability          PLAN:   We discussed some of the etiologies and natural histories of     ICD-10-CM    1. Recurrent left knee instability  M23.52 MRI KNEE LEFT WO CONTRAST   .   We discussed the various treatment alternatives including anti-inflammatory medications, physical therapy, injections, further imaging studies and as a last resort surgery. At this point patient continues to experience recurrent instability and after a large effusion I am quite concerned for internal derangement we will get a MRI of her left knee and evaluate the internal structures of the knee she will follow-up with me based on the results of the MRI. Patient voiced understanding agreement this plan at this point we will withhold formal physical therapy to ensure that we can work on evaluating her injury completely    Return to clinic in No follow-ups on file. Randy Fry Please be aware portions of this note were completed using voice recognition software and unforeseen errors may have occurred    Electronically signed by Tamanna Dalton DO, FAOASM  on 8/4/21 at 8:51 AM EDT        No orders of the defined types were placed in this encounter.

## 2021-08-13 ENCOUNTER — HOSPITAL ENCOUNTER (OUTPATIENT)
Dept: MRI IMAGING | Facility: CLINIC | Age: 31
Discharge: HOME | End: 2021-08-15
Payer: COMMERCIAL

## 2021-08-13 DIAGNOSIS — M23.52 RECURRENT LEFT KNEE INSTABILITY: ICD-10-CM

## 2021-08-13 PROCEDURE — 73721 MRI JNT OF LWR EXTRE W/O DYE: CPT

## 2021-08-18 ENCOUNTER — OFFICE VISIT (OUTPATIENT)
Dept: ORTHOPEDIC SURGERY | Age: 31
End: 2021-08-18
Payer: COMMERCIAL

## 2021-08-18 VITALS — SYSTOLIC BLOOD PRESSURE: 127 MMHG | HEART RATE: 67 BPM | DIASTOLIC BLOOD PRESSURE: 89 MMHG

## 2021-08-18 DIAGNOSIS — M76.52 PATELLAR TENDONITIS OF LEFT KNEE: Primary | ICD-10-CM

## 2021-08-18 PROCEDURE — 99213 OFFICE O/P EST LOW 20 MIN: CPT | Performed by: FAMILY MEDICINE

## 2021-08-18 NOTE — PROGRESS NOTES
Sports Medicine Consultation     CHIEF COMPLAINT:  Knee Pain (Lt knee f/u. here for MRI results. has given out since last visit. pain worse)      HPI:  Ramin Pandey is a 36y.o. year old female who is a  established patient being seen for regarding follow up of a pre-existing problem left knee pain. The pain has been present for 5+ month(s). The patient recalls a previous hyperextension injury. The patient has tried PT, THC, MRI without improvement. The pain is described as achy. There is  pain on weightbearing. The knee does swell. There is is not painful popping and clicking. The knee does not catch or lock. It has given out. It is  stiff upon arising from sitting. It is  painful to go up and down stairs and sit for a prolonged period of time. she has a past medical history of Anxiety and IUD (intrauterine device) in place. she has a past surgical history that includes intrauterine device insertion (11/29/2017) and [place] tooth extraction. family history includes Allergy (Severe) in her father and mother; Heart Disease in her maternal grandfather; High Blood Pressure in her mother. Social History     Socioeconomic History    Marital status: Single     Spouse name: Not on file    Number of children: Not on file    Years of education: Not on file    Highest education level: Not on file   Occupational History    Not on file   Tobacco Use    Smoking status: Former Smoker     Types: Cigarettes    Smokeless tobacco: Never Used    Tobacco comment: socially   Substance and Sexual Activity    Alcohol use: Yes     Alcohol/week: 3.0 standard drinks     Types: 3 Cans of beer per week     Comment: monthly twice    Drug use: Not on file     Comment: in past    Sexual activity: Yes     Partners: Male     Birth control/protection: I.U.D.    Other Topics Concern    Not on file   Social History Narrative    Not on file     Social Determinants of Health     Financial Resource Strain:  Difficulty of Paying Living Expenses:    Food Insecurity:     Worried About Running Out of Food in the Last Year:     Ran Out of Food in the Last Year:    Transportation Needs:     Lack of Transportation (Medical):  Lack of Transportation (Non-Medical):    Physical Activity:     Days of Exercise per Week:     Minutes of Exercise per Session:    Stress:     Feeling of Stress :    Social Connections:     Frequency of Communication with Friends and Family:     Frequency of Social Gatherings with Friends and Family:     Attends Shinto Services:     Active Member of Clubs or Organizations:     Attends Club or Organization Meetings:     Marital Status:    Intimate Partner Violence:     Fear of Current or Ex-Partner:     Emotionally Abused:     Physically Abused:     Sexually Abused:        Current Outpatient Medications   Medication Sig Dispense Refill    meloxicam (MOBIC) 15 MG tablet Take 1 tablet by mouth daily Take for 5 days when premenstrual symptoms begin. 30 tablet 3    nystatin (MYCOSTATIN) 447422 UNIT/GM powder Apply 2 times daily as needed for itching/irritation 1 Bottle 0    ketoconazole (NIZORAL) 2 % shampoo Apply topically daily to scalp  2    clobetasol (TEMOVATE) 0.05 % cream APPLY TWICE DAILY EXTERNALLY TO THE AFFECTED AREA. 14 DAYS TOTAL. 15 g 0    albuterol sulfate HFA (PROAIR HFA) 108 (90 Base) MCG/ACT inhaler Inhale 2 puffs into the lungs every 6 hours as needed for Wheezing 1 Inhaler 3     Current Facility-Administered Medications   Medication Dose Route Frequency Provider Last Rate Last Admin    Levonorgestrel [facility]) IUD 19.5 mg 1 each  1 each Intrauterine Once Elder Bain, DO   1 each at 11/29/17 1309       Allergies:  shehas No Known Allergies. ROS:  CV:  Denies chest pain; palpitations; shortness of breath; swelling of feet, ankles; and loss of consciousness. CON: Denies fever and dizziness.   ENT:  Denies hearing loss / ringing, ear infections hoarseness, and swallowing problems. RESP:  Denies chronic cough, spitting up blood, and asthma/wheezing. GI: Denies abdominal pain, change in bowel habits, nausea or vomiting, and blood in stools. :  Denies frequent urination, burning or painful urination, blood in the urine, and bladder incontinence. NEURO:  Denies headache, memory loss, sleep disturbance, and tremor or movement disorder. PHYSICAL EXAM:   /89 (Site: Left Lower Arm)   Pulse 67   GENERAL: Mega Orozco is a 36 y.o. female who is alert and oriented and sitting comfortably in our office. SKIN:  Intact without rashes, lesions or ulcerations. NEURO: Sensation to the extremity is intact. VASC:  Capillary refill is less than 3 seconds. Distal pulses are palpable. There is no lymphadenopathy. Knee Exam  Musculoskeletal/Neurologic:  Inspection-Swelling: none, Ecchymosis: no  Palpation-Tenderness:patellar tendonitis  Pain with patellar grind: yes  ROM- 0-135  Strength- WNL  Sensation-normal to light touch    Special Tests-  Varus Laxity: negative   Valgus Laxity:  negative   Anterior Drawer: negative   Posterior Drawer: negative  Lachman's: negative  Marlene's:negative  Thessaly: negative    Single Leg Squat: Positive  Deep Squat: Positive  Gait: valgus thrust    PSYCH:  Good fund of knowledge and displays understanding of exam.    RADIOLOGY: MRI KNEE LEFT WO CONTRAST    Result Date: 8/13/2021  1. Thinning of the ACL. No acute ligamentous or meniscal injury evident. 2. Mild patellar tendinosis. Mild distal quadriceps tendinosis. 3. Small Baker's cyst. 4. No sizable joint effusion. IMPRESSION:     1. Patellar tendonitis of left knee          PLAN:   We discussed some of the etiologies and natural histories of     ICD-10-CM    1. Patellar tendonitis of left knee  M76.52    .   We discussed the various treatment alternatives including anti-inflammatory medications, physical therapy, injections, further imaging studies and as a last resort

## 2021-08-26 DIAGNOSIS — M76.52 PATELLAR TENDONITIS OF LEFT KNEE: Primary | ICD-10-CM

## 2022-05-10 ENCOUNTER — OFFICE VISIT (OUTPATIENT)
Dept: ORTHOPEDIC SURGERY | Age: 32
End: 2022-05-10
Payer: COMMERCIAL

## 2022-05-10 VITALS — WEIGHT: 120 LBS | BODY MASS INDEX: 21.26 KG/M2 | HEIGHT: 63 IN

## 2022-05-10 DIAGNOSIS — M76.52 PATELLAR TENDONITIS OF LEFT KNEE: Primary | ICD-10-CM

## 2022-05-10 PROCEDURE — 99213 OFFICE O/P EST LOW 20 MIN: CPT | Performed by: FAMILY MEDICINE

## 2022-05-10 NOTE — PROGRESS NOTES
Sports Medicine Consultation     CHIEF COMPLAINT:  Knee Pain (Left f/u. no new injury. stopped PT due to insurance change. has increased pain if not doing constant activity)      HPI:  Mark Simmons is a 36y.o. year old female who is a  established patient being seen for regarding follow up of a pre-existing problem left knee pain. The pain has been present for on and off for past year(s). The patient recalls a no new injury. The patient has tried PT (a couple of months) without improvement. The pain is described as dull, burning and achy. There is  pain on weightbearing. The knee does not swell. There is is not painful popping and clicking. The knee does not catch or lock. It has not given out. It is  stiff upon arising from sitting. It is  painful to go up and down stairs and sit for a prolonged period of time. she has a past medical history of Anxiety and IUD (intrauterine device) in place. she has a past surgical history that includes intrauterine device insertion (11/29/2017) and [place] tooth extraction. family history includes Allergy (Severe) in her father and mother; Heart Disease in her maternal grandfather; High Blood Pressure in her mother. Social History     Socioeconomic History    Marital status: Single     Spouse name: Not on file    Number of children: Not on file    Years of education: Not on file    Highest education level: Not on file   Occupational History    Not on file   Tobacco Use    Smoking status: Former Smoker     Types: Cigarettes    Smokeless tobacco: Never Used    Tobacco comment: socially   Substance and Sexual Activity    Alcohol use: Yes     Alcohol/week: 3.0 standard drinks     Types: 3 Cans of beer per week     Comment: monthly twice    Drug use: Not on file     Comment: in past    Sexual activity: Yes     Partners: Male     Birth control/protection: I.U.D.    Other Topics Concern    Not on file   Social History Narrative    Not on file     Social Determinants of Health     Financial Resource Strain:     Difficulty of Paying Living Expenses: Not on file   Food Insecurity:     Worried About Running Out of Food in the Last Year: Not on file    Xochitl of Food in the Last Year: Not on file   Transportation Needs:     Lack of Transportation (Medical): Not on file    Lack of Transportation (Non-Medical): Not on file   Physical Activity:     Days of Exercise per Week: Not on file    Minutes of Exercise per Session: Not on file   Stress:     Feeling of Stress : Not on file   Social Connections:     Frequency of Communication with Friends and Family: Not on file    Frequency of Social Gatherings with Friends and Family: Not on file    Attends Denominational Services: Not on file    Active Member of [address] [place] [address] [employer] or Organizations: Not on file    Attends Club or Organization Meetings: Not on file    Marital Status: Not on file   Intimate Partner Violence:     Fear of Current or Ex-Partner: Not on file    Emotionally Abused: Not on file    Physically Abused: Not on file    Sexually Abused: Not on file   Housing Stability:     Unable to Pay for Housing in the Last Year: Not on file    Number of [place] in the Last Year: Not on file    Unstable Housing in the Last Year: Not on file       Current Outpatient Medications   Medication Sig Dispense Refill    meloxicam (MOBIC) 15 MG tablet Take 1 tablet by mouth daily Take for 5 days when premenstrual symptoms begin. 30 tablet 3    nystatin (MYCOSTATIN) 458681 UNIT/GM powder Apply 2 times daily as needed for itching/irritation 1 Bottle 0    ketoconazole (NIZORAL) 2 % shampoo Apply topically daily to scalp  2    clobetasol (TEMOVATE) 0.05 % cream APPLY TWICE DAILY EXTERNALLY TO THE AFFECTED AREA. 14 DAYS TOTAL.  15 g 0    albuterol sulfate HFA (PROAIR HFA) 108 (90 Base) MCG/ACT inhaler Inhale 2 puffs into the lungs every 6 hours as needed for Wheezing 1 Inhaler 3     Current Facility-Administered Medications   Medication Dose Route Frequency Provider Last Rate Last Admin    Levonorgestrel [facility]) IUD 19.5 mg 1 each  1 each IntraUTERine Once Susan Lawson, DO   1 each at 11/29/17 4259       Allergies:  shehas No Known Allergies. ROS:  CV:  Denies chest pain; palpitations; shortness of breath; swelling of feet, ankles; and loss of consciousness. CON: Denies fever and dizziness. ENT:  Denies hearing loss / ringing, ear infections hoarseness, and swallowing problems. RESP:  Denies chronic cough, spitting up blood, and asthma/wheezing. GI: Denies abdominal pain, change in bowel habits, nausea or vomiting, and blood in stools. :  Denies frequent urination, burning or painful urination, blood in the urine, and bladder incontinence. NEURO:  Denies headache, memory loss, sleep disturbance, and tremor or movement disorder. PHYSICAL EXAM:   Ht 5' 3\" (1.6 m)   Wt 120 lb (54.4 kg)   BMI 21.26 kg/m²   GENERAL: Colt Spears is a 36 y.o. female who is alert and oriented and sitting comfortably in our office. SKIN:  Intact without rashes, lesions or ulcerations. NEURO: Sensation to the extremity is intact. VASC:  Capillary refill is less than 3 seconds. Distal pulses are palpable. There is no lymphadenopathy. Knee Exam  Musculoskeletal/Neurologic:  Inspection-Swelling: none, Ecchymosis: no  Palpation-Tenderness:pf mild  Pain with patellar grind: yes  ROM- -  Strength- WNL  Sensation-normal to light touch    Special Tests-  Varus Laxity: negative   Valgus Laxity:  negative   Anterior Drawer: negative   Posterior Drawer: negative  Lachman's: negative  Marlene's:negative    Gait: normal    PSYCH:  Good fund of knowledge and displays understanding of exam.    RADIOLOGY: No results found. IMPRESSION:     1. Patellar tendonitis of left knee          PLAN:   We discussed some of the etiologies and natural histories of     ICD-10-CM    1. Patellar tendonitis of left knee  M76.52    .   We discussed the various treatment alternatives including anti-inflammatory medications, physical therapy, injections, further imaging studies and as a last resort surgery. This point patient seems to be in the wrong type of brace we will provide her a quality patellofemoral brace and home exercise program she will follow-up with us otherwise as needed she voiced understanding and agreement with this plan    Return to clinic in No follow-ups on file. García Griffith Please be aware portions of this note were completed using voice recognition software and unforeseen errors may have occurred    Electronically signed by Uriel Bustos DO, FAOASM  on 5/10/22 at 10:21 AM EDT        No orders of the defined types were placed in this encounter.